# Patient Record
Sex: MALE | Race: WHITE | HISPANIC OR LATINO | URBAN - METROPOLITAN AREA
[De-identification: names, ages, dates, MRNs, and addresses within clinical notes are randomized per-mention and may not be internally consistent; named-entity substitution may affect disease eponyms.]

---

## 2017-03-24 ENCOUNTER — FOLLOW UP ESTABLISHED (OUTPATIENT)
Dept: URBAN - METROPOLITAN AREA CLINIC 51 | Facility: CLINIC | Age: 67
End: 2017-03-24
Payer: COMMERCIAL

## 2017-03-24 DIAGNOSIS — H52.4 PRESBYOPIA: ICD-10-CM

## 2017-03-24 DIAGNOSIS — H25.13 AGE-RELATED NUCLEAR CATARACT, BILATERAL: Primary | ICD-10-CM

## 2017-03-24 PROCEDURE — 92014 COMPRE OPH EXAM EST PT 1/>: CPT | Performed by: OPTOMETRIST

## 2017-03-24 PROCEDURE — 92015 DETERMINE REFRACTIVE STATE: CPT | Performed by: OPTOMETRIST

## 2017-03-24 ASSESSMENT — VISUAL ACUITY
OS: 20/25
OD: 20/20

## 2017-03-24 ASSESSMENT — INTRAOCULAR PRESSURE
OD: 16
OS: 15

## 2018-08-02 ENCOUNTER — FOLLOW UP ESTABLISHED (OUTPATIENT)
Dept: URBAN - METROPOLITAN AREA CLINIC 51 | Facility: CLINIC | Age: 68
End: 2018-08-02
Payer: COMMERCIAL

## 2018-08-02 PROCEDURE — 76514 ECHO EXAM OF EYE THICKNESS: CPT | Performed by: OPTOMETRIST

## 2018-08-02 PROCEDURE — 92014 COMPRE OPH EXAM EST PT 1/>: CPT | Performed by: OPTOMETRIST

## 2018-08-02 PROCEDURE — 92015 DETERMINE REFRACTIVE STATE: CPT | Performed by: OPTOMETRIST

## 2018-08-02 PROCEDURE — 92133 CPTRZD OPH DX IMG PST SGM ON: CPT | Performed by: OPTOMETRIST

## 2018-08-02 ASSESSMENT — INTRAOCULAR PRESSURE
OD: 24
OS: 26
OD: 30
OS: 32

## 2018-08-02 ASSESSMENT — KERATOMETRY
OD: 43.56
OS: 42.62

## 2018-08-02 ASSESSMENT — VISUAL ACUITY
OS: 20/25
OD: 20/20

## 2018-09-13 ENCOUNTER — FOLLOW UP ESTABLISHED (OUTPATIENT)
Dept: URBAN - METROPOLITAN AREA CLINIC 51 | Facility: CLINIC | Age: 68
End: 2018-09-13
Payer: COMMERCIAL

## 2018-09-13 DIAGNOSIS — H40.053 OCULAR HYPERTENSION, BILATERAL: Primary | ICD-10-CM

## 2018-09-13 PROCEDURE — 92083 EXTENDED VISUAL FIELD XM: CPT | Performed by: OPTOMETRIST

## 2018-09-13 PROCEDURE — 92012 INTRM OPH EXAM EST PATIENT: CPT | Performed by: OPTOMETRIST

## 2018-09-13 RX ORDER — LATANOPROST 50 UG/ML
0.005 % SOLUTION OPHTHALMIC
Qty: 3 | Refills: 3 | Status: INACTIVE
Start: 2018-09-13 | End: 2018-11-15

## 2018-09-13 ASSESSMENT — INTRAOCULAR PRESSURE
OS: 33
OD: 25

## 2018-10-18 ENCOUNTER — FOLLOW UP ESTABLISHED (OUTPATIENT)
Dept: URBAN - METROPOLITAN AREA CLINIC 51 | Facility: CLINIC | Age: 68
End: 2018-10-18
Payer: COMMERCIAL

## 2018-10-18 PROCEDURE — 76514 ECHO EXAM OF EYE THICKNESS: CPT | Performed by: OPTOMETRIST

## 2018-10-18 PROCEDURE — 92012 INTRM OPH EXAM EST PATIENT: CPT | Performed by: OPTOMETRIST

## 2018-10-18 RX ORDER — BRIMONIDINE TARTRATE 1 MG/ML
0.1 % SOLUTION/ DROPS OPHTHALMIC
Qty: 3 | Refills: 1 | Status: INACTIVE
Start: 2018-10-18 | End: 2019-01-10

## 2018-10-18 ASSESSMENT — INTRAOCULAR PRESSURE
OS: 24
OD: 20

## 2019-02-12 ENCOUNTER — FOLLOW UP ESTABLISHED (OUTPATIENT)
Dept: URBAN - METROPOLITAN AREA CLINIC 51 | Facility: CLINIC | Age: 69
End: 2019-02-12
Payer: COMMERCIAL

## 2019-02-12 PROCEDURE — 92012 INTRM OPH EXAM EST PATIENT: CPT | Performed by: OPTOMETRIST

## 2019-02-12 ASSESSMENT — INTRAOCULAR PRESSURE
OS: 20
OD: 20

## 2019-09-11 ENCOUNTER — FOLLOW UP ESTABLISHED (OUTPATIENT)
Dept: URBAN - METROPOLITAN AREA CLINIC 51 | Facility: CLINIC | Age: 69
End: 2019-09-11
Payer: COMMERCIAL

## 2019-09-11 PROCEDURE — 92083 EXTENDED VISUAL FIELD XM: CPT | Performed by: OPTOMETRIST

## 2019-09-11 PROCEDURE — 92015 DETERMINE REFRACTIVE STATE: CPT | Performed by: OPTOMETRIST

## 2019-09-11 PROCEDURE — 92133 CPTRZD OPH DX IMG PST SGM ON: CPT | Performed by: OPTOMETRIST

## 2019-09-11 PROCEDURE — 92014 COMPRE OPH EXAM EST PT 1/>: CPT | Performed by: OPTOMETRIST

## 2019-09-11 PROCEDURE — 92134 CPTRZ OPH DX IMG PST SGM RTA: CPT | Performed by: OPTOMETRIST

## 2019-09-11 RX ORDER — BRINZOLAMIDE/BRIMONIDINE TARTRATE 10; 2 MG/ML; MG/ML
SUSPENSION/ DROPS OPHTHALMIC
Qty: 3 | Refills: 3 | Status: INACTIVE
Start: 2019-09-11 | End: 2019-09-19

## 2019-09-11 RX ORDER — LATANOPROST 50 UG/ML
0.005 % SOLUTION OPHTHALMIC
Qty: 3 | Refills: 3 | Status: INACTIVE
Start: 2019-09-11 | End: 2020-05-01

## 2019-09-11 ASSESSMENT — INTRAOCULAR PRESSURE
OD: 25
OS: 25
OS: 28
OD: 26

## 2019-09-11 ASSESSMENT — KERATOMETRY
OS: 43.22
OD: 43.78

## 2019-09-11 ASSESSMENT — VISUAL ACUITY
OD: 20/20
OS: 20/30

## 2020-01-30 ENCOUNTER — FOLLOW UP ESTABLISHED (OUTPATIENT)
Dept: URBAN - METROPOLITAN AREA CLINIC 51 | Facility: CLINIC | Age: 70
End: 2020-01-30
Payer: COMMERCIAL

## 2020-01-30 PROCEDURE — 92012 INTRM OPH EXAM EST PATIENT: CPT | Performed by: OPTOMETRIST

## 2020-01-30 PROCEDURE — 92083 EXTENDED VISUAL FIELD XM: CPT | Performed by: OPTOMETRIST

## 2020-01-30 ASSESSMENT — INTRAOCULAR PRESSURE
OD: 15
OS: 19

## 2020-05-01 ENCOUNTER — FOLLOW UP ESTABLISHED (OUTPATIENT)
Dept: URBAN - METROPOLITAN AREA CLINIC 51 | Facility: CLINIC | Age: 70
End: 2020-05-01
Payer: COMMERCIAL

## 2020-05-01 PROCEDURE — 92012 INTRM OPH EXAM EST PATIENT: CPT | Performed by: OPTOMETRIST

## 2020-05-01 RX ORDER — LATANOPROST 50 UG/ML
0.005 % SOLUTION OPHTHALMIC
Qty: 3 | Refills: 3 | Status: INACTIVE
Start: 2020-05-01 | End: 2020-08-31

## 2020-05-01 RX ORDER — BRINZOLAMIDE/BRIMONIDINE TARTRATE 10; 2 MG/ML; MG/ML
SUSPENSION/ DROPS OPHTHALMIC
Qty: 3 | Refills: 3 | Status: INACTIVE
Start: 2020-05-01 | End: 2020-08-31

## 2020-05-01 ASSESSMENT — INTRAOCULAR PRESSURE
OD: 20
OS: 21

## 2020-09-21 ENCOUNTER — FOLLOW UP ESTABLISHED (OUTPATIENT)
Dept: URBAN - METROPOLITAN AREA CLINIC 51 | Facility: CLINIC | Age: 70
End: 2020-09-21
Payer: COMMERCIAL

## 2020-09-21 PROCEDURE — 92014 COMPRE OPH EXAM EST PT 1/>: CPT | Performed by: OPTOMETRIST

## 2020-09-21 PROCEDURE — 92133 CPTRZD OPH DX IMG PST SGM ON: CPT | Performed by: OPTOMETRIST

## 2020-09-21 PROCEDURE — 92083 EXTENDED VISUAL FIELD XM: CPT | Performed by: OPTOMETRIST

## 2020-09-21 PROCEDURE — 92015 DETERMINE REFRACTIVE STATE: CPT | Performed by: OPTOMETRIST

## 2020-09-21 ASSESSMENT — INTRAOCULAR PRESSURE
OD: 16
OS: 16
OD: 14
OS: 15

## 2020-09-21 ASSESSMENT — VISUAL ACUITY
OS: 20/25
OD: 20/20

## 2020-09-21 ASSESSMENT — KERATOMETRY: OD: 43.73

## 2021-03-16 ENCOUNTER — FOLLOW UP ESTABLISHED (OUTPATIENT)
Dept: URBAN - METROPOLITAN AREA CLINIC 51 | Facility: CLINIC | Age: 71
End: 2021-03-16
Payer: COMMERCIAL

## 2021-03-16 DIAGNOSIS — H47.212 PRIMARY OPTIC ATROPHY, LEFT EYE: ICD-10-CM

## 2021-03-16 DIAGNOSIS — H04.123 TEAR FILM INSUFFICIENCY OF BILATERAL LACRIMAL GLANDS: ICD-10-CM

## 2021-03-16 PROCEDURE — 99213 OFFICE O/P EST LOW 20 MIN: CPT | Performed by: OPTOMETRIST

## 2021-03-16 PROCEDURE — 92083 EXTENDED VISUAL FIELD XM: CPT | Performed by: OPTOMETRIST

## 2021-03-16 RX ORDER — LATANOPROST 50 UG/ML
0.005 % SOLUTION OPHTHALMIC
Qty: 7.5 | Refills: 3 | Status: INACTIVE
Start: 2021-03-16 | End: 2021-09-30

## 2021-03-16 RX ORDER — BRINZOLAMIDE/BRIMONIDINE TARTRATE 10; 2 MG/ML; MG/ML
SUSPENSION/ DROPS OPHTHALMIC
Qty: 10 | Refills: 3 | Status: INACTIVE
Start: 2021-03-16 | End: 2021-09-30

## 2021-03-16 ASSESSMENT — INTRAOCULAR PRESSURE
OD: 24
OS: 24

## 2021-06-09 ENCOUNTER — OFFICE VISIT (OUTPATIENT)
Dept: URBAN - METROPOLITAN AREA CLINIC 51 | Facility: CLINIC | Age: 71
End: 2021-06-09
Payer: COMMERCIAL

## 2021-06-09 PROCEDURE — 99214 OFFICE O/P EST MOD 30 MIN: CPT | Performed by: OPTOMETRIST

## 2021-06-09 RX ORDER — TIMOLOL 5.12 MG/ML
0.5 % SOLUTION/ DROPS OPHTHALMIC
Qty: 7.5 | Refills: 1 | Status: INACTIVE
Start: 2021-06-09 | End: 2021-09-01

## 2021-06-09 ASSESSMENT — INTRAOCULAR PRESSURE
OD: 18
OS: 21

## 2021-06-09 NOTE — IMPRESSION/PLAN
Impression: Ocular hypertension, bilateral
continue latanoprost QPM OU simbrinza bid OU Pach (10/18/18) 590/590 Plan: IOP 17/21 RNFL wnl OD abnormal OS 
VF general reduction > OD ring defect 
(9/11/19)- In alt OS - likely ischemic optic neuropathy OS - OLD Start Timolol BID OU 
RTC 3mos CE/RNFL

## 2021-09-30 ENCOUNTER — OFFICE VISIT (OUTPATIENT)
Dept: URBAN - METROPOLITAN AREA CLINIC 51 | Facility: CLINIC | Age: 71
End: 2021-09-30
Payer: COMMERCIAL

## 2021-09-30 PROCEDURE — 92133 CPTRZD OPH DX IMG PST SGM ON: CPT | Performed by: OPTOMETRIST

## 2021-09-30 PROCEDURE — 92014 COMPRE OPH EXAM EST PT 1/>: CPT | Performed by: OPTOMETRIST

## 2021-09-30 RX ORDER — BRINZOLAMIDE/BRIMONIDINE TARTRATE 10; 2 MG/ML; MG/ML
SUSPENSION/ DROPS OPHTHALMIC
Qty: 10 | Refills: 3 | Status: ACTIVE
Start: 2021-09-30

## 2021-09-30 RX ORDER — LATANOPROST 50 UG/ML
0.005 % SOLUTION OPHTHALMIC
Qty: 7.5 | Refills: 3 | Status: ACTIVE
Start: 2021-09-30

## 2021-09-30 RX ORDER — TIMOLOL 5.12 MG/ML
0.5 % SOLUTION/ DROPS OPHTHALMIC
Qty: 7.5 | Refills: 1 | Status: INACTIVE
Start: 2021-09-30 | End: 2022-03-30

## 2021-09-30 ASSESSMENT — INTRAOCULAR PRESSURE
OS: 14
OD: 15
OS: 17

## 2021-09-30 ASSESSMENT — VISUAL ACUITY
OS: 20/25
OD: 20/20

## 2021-09-30 ASSESSMENT — KERATOMETRY
OS: 42.42
OD: 43.47

## 2021-09-30 NOTE — IMPRESSION/PLAN
Impression: Ocular hypertension, bilateral
Pach (10/18/18) 590/590 Plan: IOP 15/14 RNFL wnl OD abnormal OS 
VF general reduction > OD ring defect, arcuate defect OS 
(9/11/19)- In alt OS - likely ischemic optic neuropathy OS - OLD
continue Timolol BID OU, continue Latanoprost QPM OU simbrinza bid OU 
RTC 6mos IOP/HVF

## 2022-03-30 ENCOUNTER — OFFICE VISIT (OUTPATIENT)
Dept: URBAN - METROPOLITAN AREA CLINIC 51 | Facility: CLINIC | Age: 72
End: 2022-03-30
Payer: COMMERCIAL

## 2022-03-30 PROCEDURE — 99213 OFFICE O/P EST LOW 20 MIN: CPT | Performed by: OPTOMETRIST

## 2022-03-30 PROCEDURE — 92083 EXTENDED VISUAL FIELD XM: CPT | Performed by: OPTOMETRIST

## 2022-03-30 RX ORDER — TIMOLOL 5.12 MG/ML
0.5 % SOLUTION/ DROPS OPHTHALMIC
Qty: 7.5 | Refills: 1 | Status: ACTIVE
Start: 2022-03-30

## 2022-03-30 ASSESSMENT — INTRAOCULAR PRESSURE
OD: 17
OS: 18

## 2022-03-30 NOTE — IMPRESSION/PLAN
Impression: Ocular hypertension, bilateral
Pach (10/18/18) 590/590 Plan: IOP 17/18 slight increase RNFL wnl OD abnormal OS 
VF general reduction > In alt OS - likely ischemic optic neuropathy OS - OLD
continue Timolol BID OU, continue Latanoprost QPM OU simbrinza bid OU 
(pt reports missing a couple gtts here and there) 3mos IOP may consider change in drops or referral to glc if iop > than goal

## 2022-07-20 ENCOUNTER — OFFICE VISIT (OUTPATIENT)
Dept: URBAN - METROPOLITAN AREA CLINIC 51 | Facility: CLINIC | Age: 72
End: 2022-07-20
Payer: COMMERCIAL

## 2022-07-20 DIAGNOSIS — H52.4 PRESBYOPIA: ICD-10-CM

## 2022-07-20 DIAGNOSIS — H40.053 OCULAR HYPERTENSION, BILATERAL: Primary | ICD-10-CM

## 2022-07-20 PROCEDURE — 99214 OFFICE O/P EST MOD 30 MIN: CPT | Performed by: OPTOMETRIST

## 2022-07-20 RX ORDER — TIMOLOL 5.12 MG/ML
0.5 % SOLUTION/ DROPS OPHTHALMIC
Qty: 7.5 | Refills: 2 | Status: ACTIVE
Start: 2022-07-20

## 2022-07-20 RX ORDER — LATANOPROST 50 UG/ML
0.005 % SOLUTION OPHTHALMIC
Qty: 7.5 | Refills: 3 | Status: ACTIVE
Start: 2022-07-20

## 2022-07-20 RX ORDER — BRINZOLAMIDE/BRIMONIDINE TARTRATE 10; 2 MG/ML; MG/ML
SUSPENSION/ DROPS OPHTHALMIC
Qty: 10 | Refills: 3 | Status: ACTIVE
Start: 2022-07-20

## 2022-07-20 ASSESSMENT — INTRAOCULAR PRESSURE
OD: 18
OS: 21

## 2022-07-20 NOTE — IMPRESSION/PLAN
Impression: Ocular hypertension, bilateral
Pach (10/18/18) 590/590 Plan: IOP 18/21 increasing RNFL wnl OD abnormal OS 
VF general reduction > In alt OS - likely ischemic optic neuropathy OS - OLD
continue Timolol BID OU, continue Latanoprost QPM OU simbrinza bid OU 
(pt reports missing a couple gtts here and there) 
referral to glc

## 2022-10-24 ENCOUNTER — OFFICE VISIT (OUTPATIENT)
Dept: URBAN - METROPOLITAN AREA CLINIC 44 | Facility: CLINIC | Age: 72
End: 2022-10-24
Payer: COMMERCIAL

## 2022-10-24 DIAGNOSIS — H47.212 PRIMARY OPTIC ATROPHY, LEFT EYE: ICD-10-CM

## 2022-10-24 DIAGNOSIS — H04.123 TEAR FILM INSUFFICIENCY OF BILATERAL LACRIMAL GLANDS: ICD-10-CM

## 2022-10-24 DIAGNOSIS — H25.13 AGE-RELATED NUCLEAR CATARACT, BILATERAL: ICD-10-CM

## 2022-10-24 DIAGNOSIS — H40.053 OCULAR HYPERTENSION, BILATERAL: ICD-10-CM

## 2022-10-24 DIAGNOSIS — H40.1134 PRIMARY OPEN-ANGLE GLAUCOMA, BILATERAL, INDETERMINATE STAGE: Primary | ICD-10-CM

## 2022-10-24 PROCEDURE — 99204 OFFICE O/P NEW MOD 45 MIN: CPT | Performed by: OPHTHALMOLOGY

## 2022-10-24 PROCEDURE — 92020 GONIOSCOPY: CPT | Performed by: OPHTHALMOLOGY

## 2022-10-24 PROCEDURE — 92083 EXTENDED VISUAL FIELD XM: CPT | Performed by: OPHTHALMOLOGY

## 2022-10-24 RX ORDER — TIMOLOL MALEATE 5 MG/ML
0.5 % SOLUTION/ DROPS OPHTHALMIC
Qty: 7.5 | Refills: 1 | Status: ACTIVE
Start: 2022-10-24

## 2022-10-24 RX ORDER — LATANOPROST 50 UG/ML
0.005 % SOLUTION OPHTHALMIC
Qty: 7.5 | Refills: 1 | Status: ACTIVE
Start: 2022-10-24

## 2022-10-24 RX ORDER — BRINZOLAMIDE/BRIMONIDINE TARTRATE 10; 2 MG/ML; MG/ML
SUSPENSION/ DROPS OPHTHALMIC
Qty: 15 | Refills: 1 | Status: ACTIVE
Start: 2022-10-24

## 2022-10-24 ASSESSMENT — INTRAOCULAR PRESSURE
OS: 16
OD: 14
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
## 2022-10-24 NOTE — IMPRESSION/PLAN
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
See other plan R35.2316

## 2023-02-20 ENCOUNTER — OFFICE VISIT (OUTPATIENT)
Dept: URBAN - METROPOLITAN AREA CLINIC 44 | Facility: CLINIC | Age: 73
End: 2023-02-20
Payer: COMMERCIAL

## 2023-02-20 DIAGNOSIS — H47.212 PRIMARY OPTIC ATROPHY, LEFT EYE: ICD-10-CM

## 2023-02-20 DIAGNOSIS — H40.1134 PRIMARY OPEN-ANGLE GLAUCOMA, BILATERAL, INDETERMINATE STAGE: Primary | ICD-10-CM

## 2023-02-20 DIAGNOSIS — H04.123 TEAR FILM INSUFFICIENCY OF BILATERAL LACRIMAL GLANDS: ICD-10-CM

## 2023-02-20 DIAGNOSIS — H25.13 AGE-RELATED NUCLEAR CATARACT, BILATERAL: ICD-10-CM

## 2023-02-20 PROCEDURE — 92133 CPTRZD OPH DX IMG PST SGM ON: CPT | Performed by: OPHTHALMOLOGY

## 2023-02-20 PROCEDURE — 99213 OFFICE O/P EST LOW 20 MIN: CPT | Performed by: OPHTHALMOLOGY

## 2023-02-20 RX ORDER — BRINZOLAMIDE/BRIMONIDINE TARTRATE 10; 2 MG/ML; MG/ML
SUSPENSION/ DROPS OPHTHALMIC
Qty: 15 | Refills: 1 | Status: ACTIVE
Start: 2023-02-20

## 2023-02-20 RX ORDER — LATANOPROST 50 UG/ML
0.005 % SOLUTION OPHTHALMIC
Qty: 7.5 | Refills: 1 | Status: ACTIVE
Start: 2023-02-20

## 2023-02-20 RX ORDER — TIMOLOL MALEATE 5 MG/ML
0.5 % SOLUTION/ DROPS OPHTHALMIC
Qty: 7.5 | Refills: 1 | Status: ACTIVE
Start: 2023-02-20

## 2023-02-20 ASSESSMENT — INTRAOCULAR PRESSURE
OD: 15
OS: 15
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
## 2023-06-26 ENCOUNTER — OFFICE VISIT (OUTPATIENT)
Dept: URBAN - METROPOLITAN AREA CLINIC 44 | Facility: CLINIC | Age: 73
End: 2023-06-26
Payer: COMMERCIAL

## 2023-06-26 DIAGNOSIS — H02.834 DERMATOCHALASIS OF LEFT UPPER EYELID: ICD-10-CM

## 2023-06-26 DIAGNOSIS — H47.212 PRIMARY OPTIC ATROPHY, LEFT EYE: ICD-10-CM

## 2023-06-26 DIAGNOSIS — H40.1134 PRIMARY OPEN-ANGLE GLAUCOMA, BILATERAL, INDETERMINATE STAGE: Primary | ICD-10-CM

## 2023-06-26 DIAGNOSIS — H02.831 DERMATOCHALASIS OF RIGHT UPPER EYELID: ICD-10-CM

## 2023-06-26 DIAGNOSIS — H25.13 AGE-RELATED NUCLEAR CATARACT, BILATERAL: ICD-10-CM

## 2023-06-26 DIAGNOSIS — H04.123 TEAR FILM INSUFFICIENCY OF BILATERAL LACRIMAL GLANDS: ICD-10-CM

## 2023-06-26 PROCEDURE — 92083 EXTENDED VISUAL FIELD XM: CPT | Performed by: OPHTHALMOLOGY

## 2023-06-26 PROCEDURE — 99213 OFFICE O/P EST LOW 20 MIN: CPT | Performed by: OPHTHALMOLOGY

## 2023-06-26 RX ORDER — LATANOPROST 50 UG/ML
0.005 % SOLUTION OPHTHALMIC
Qty: 7.5 | Refills: 1 | Status: ACTIVE
Start: 2023-06-26

## 2023-06-26 RX ORDER — TIMOLOL MALEATE 5 MG/ML
0.5 % SOLUTION/ DROPS OPHTHALMIC
Qty: 10 | Refills: 1 | Status: ACTIVE
Start: 2023-06-26

## 2023-06-26 RX ORDER — BRINZOLAMIDE/BRIMONIDINE TARTRATE 10; 2 MG/ML; MG/ML
SUSPENSION/ DROPS OPHTHALMIC
Qty: 15 | Refills: 1 | Status: ACTIVE
Start: 2023-06-26

## 2023-06-26 RX ORDER — TIMOLOL MALEATE 5 MG/ML
0.5 % SOLUTION/ DROPS OPHTHALMIC
Qty: 7.5 | Refills: 1 | Status: INACTIVE
Start: 2023-06-26 | End: 2023-06-26

## 2023-06-26 ASSESSMENT — INTRAOCULAR PRESSURE
OD: 16
OS: 16
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
## 2023-06-26 NOTE — IMPRESSION/PLAN
Impression: Age-related nuclear cataract, bilateral Plan: monitor, will obtain cat/complete eval when patient wishes

## 2023-06-26 NOTE — IMPRESSION/PLAN
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
See other plan A50.7122

## 2023-10-02 ENCOUNTER — OFFICE VISIT (OUTPATIENT)
Dept: URBAN - METROPOLITAN AREA CLINIC 44 | Facility: CLINIC | Age: 73
End: 2023-10-02
Payer: COMMERCIAL

## 2023-10-02 DIAGNOSIS — H04.123 TEAR FILM INSUFFICIENCY OF BILATERAL LACRIMAL GLANDS: ICD-10-CM

## 2023-10-02 DIAGNOSIS — H02.831 DERMATOCHALASIS OF RIGHT UPPER EYELID: ICD-10-CM

## 2023-10-02 DIAGNOSIS — H47.212 PRIMARY OPTIC ATROPHY, LEFT EYE: ICD-10-CM

## 2023-10-02 DIAGNOSIS — H25.13 AGE-RELATED NUCLEAR CATARACT, BILATERAL: ICD-10-CM

## 2023-10-02 DIAGNOSIS — H02.834 DERMATOCHALASIS OF LEFT UPPER EYELID: ICD-10-CM

## 2023-10-02 DIAGNOSIS — H40.1134 PRIMARY OPEN-ANGLE GLAUCOMA, BILATERAL, INDETERMINATE STAGE: Primary | ICD-10-CM

## 2023-10-02 PROCEDURE — 99214 OFFICE O/P EST MOD 30 MIN: CPT | Performed by: OPHTHALMOLOGY

## 2023-10-02 RX ORDER — BRINZOLAMIDE/BRIMONIDINE TARTRATE 10; 2 MG/ML; MG/ML
SUSPENSION/ DROPS OPHTHALMIC
Qty: 15 | Refills: 2 | Status: ACTIVE
Start: 2023-10-02

## 2023-10-02 RX ORDER — TIMOLOL MALEATE 5 MG/ML
0.5 % SOLUTION/ DROPS OPHTHALMIC
Qty: 10 | Refills: 1 | Status: ACTIVE
Start: 2023-10-02

## 2023-10-02 RX ORDER — LATANOPROST 50 UG/ML
0.005 % SOLUTION OPHTHALMIC
Qty: 7.5 | Refills: 1 | Status: ACTIVE
Start: 2023-10-02

## 2023-10-02 ASSESSMENT — INTRAOCULAR PRESSURE
OS: 24
OD: 23

## 2023-11-10 ENCOUNTER — OFFICE VISIT (OUTPATIENT)
Dept: URBAN - METROPOLITAN AREA CLINIC 51 | Facility: CLINIC | Age: 73
End: 2023-11-10
Payer: COMMERCIAL

## 2023-11-10 DIAGNOSIS — H40.053 OCULAR HYPERTENSION, BILATERAL: ICD-10-CM

## 2023-11-10 DIAGNOSIS — H02.834 DERMATOCHALASIS OF LEFT UPPER EYELID: ICD-10-CM

## 2023-11-10 DIAGNOSIS — H40.1134 PRIMARY OPEN-ANGLE GLAUCOMA, BILATERAL, INDETERMINATE STAGE: Primary | ICD-10-CM

## 2023-11-10 DIAGNOSIS — H02.831 DERMATOCHALASIS OF RIGHT UPPER EYELID: ICD-10-CM

## 2023-11-10 DIAGNOSIS — H25.13 AGE-RELATED NUCLEAR CATARACT, BILATERAL: ICD-10-CM

## 2023-11-10 DIAGNOSIS — H47.212 PRIMARY OPTIC ATROPHY, LEFT EYE: ICD-10-CM

## 2023-11-10 DIAGNOSIS — H04.123 TEAR FILM INSUFFICIENCY OF BILATERAL LACRIMAL GLANDS: ICD-10-CM

## 2023-11-10 PROCEDURE — 92020 GONIOSCOPY: CPT | Performed by: OPHTHALMOLOGY

## 2023-11-10 PROCEDURE — 92133 CPTRZD OPH DX IMG PST SGM ON: CPT | Performed by: OPHTHALMOLOGY

## 2023-11-10 PROCEDURE — 99214 OFFICE O/P EST MOD 30 MIN: CPT | Performed by: OPHTHALMOLOGY

## 2023-11-10 RX ORDER — BRINZOLAMIDE/BRIMONIDINE TARTRATE 10; 2 MG/ML; MG/ML
SUSPENSION/ DROPS OPHTHALMIC
Qty: 15 | Refills: 2 | Status: ACTIVE
Start: 2023-11-10

## 2023-11-10 RX ORDER — TIMOLOL MALEATE 5 MG/ML
0.5 % SOLUTION/ DROPS OPHTHALMIC
Qty: 10 | Refills: 1 | Status: ACTIVE
Start: 2023-11-10

## 2023-11-10 RX ORDER — LATANOPROST 50 UG/ML
0.005 % SOLUTION OPHTHALMIC
Qty: 7.5 | Refills: 1 | Status: ACTIVE
Start: 2023-11-10

## 2023-11-10 ASSESSMENT — INTRAOCULAR PRESSURE
OS: 17
OD: 17

## 2023-12-14 ENCOUNTER — OFFICE VISIT (OUTPATIENT)
Dept: URBAN - METROPOLITAN AREA CLINIC 51 | Facility: CLINIC | Age: 73
End: 2023-12-14
Payer: COMMERCIAL

## 2023-12-14 DIAGNOSIS — H40.1134 PRIMARY OPEN-ANGLE GLAUCOMA, BILATERAL, INDETERMINATE STAGE: ICD-10-CM

## 2023-12-14 DIAGNOSIS — H43.313 VITREOUS MEMBRANES AND STRANDS, BILATERAL: ICD-10-CM

## 2023-12-14 DIAGNOSIS — H25.13 AGE-RELATED NUCLEAR CATARACT, BILATERAL: Primary | ICD-10-CM

## 2023-12-14 DIAGNOSIS — H52.4 PRESBYOPIA: ICD-10-CM

## 2023-12-14 PROCEDURE — 92133 CPTRZD OPH DX IMG PST SGM ON: CPT | Performed by: OPTOMETRIST

## 2023-12-14 PROCEDURE — 99214 OFFICE O/P EST MOD 30 MIN: CPT | Performed by: OPTOMETRIST

## 2023-12-14 PROCEDURE — 92134 CPTRZ OPH DX IMG PST SGM RTA: CPT | Performed by: OPTOMETRIST

## 2023-12-14 RX ORDER — BRINZOLAMIDE/BRIMONIDINE TARTRATE 10; 2 MG/ML; MG/ML
SUSPENSION/ DROPS OPHTHALMIC
Qty: 15 | Refills: 2 | Status: ACTIVE
Start: 2023-12-14

## 2023-12-14 ASSESSMENT — INTRAOCULAR PRESSURE
OS: 10
OD: 10

## 2023-12-14 ASSESSMENT — VISUAL ACUITY
OD: 20/25
OS: 20/25

## 2023-12-14 ASSESSMENT — KERATOMETRY
OD: 43.88
OS: 42.50

## 2024-02-26 ENCOUNTER — OFFICE VISIT (OUTPATIENT)
Dept: URBAN - METROPOLITAN AREA CLINIC 51 | Facility: CLINIC | Age: 74
End: 2024-02-26
Payer: COMMERCIAL

## 2024-02-26 DIAGNOSIS — H47.212 PRIMARY OPTIC ATROPHY, LEFT EYE: ICD-10-CM

## 2024-02-26 DIAGNOSIS — H02.834 DERMATOCHALASIS OF LEFT UPPER EYELID: ICD-10-CM

## 2024-02-26 DIAGNOSIS — H25.13 AGE-RELATED NUCLEAR CATARACT, BILATERAL: ICD-10-CM

## 2024-02-26 DIAGNOSIS — H40.1134 PRIMARY OPEN-ANGLE GLAUCOMA, BILATERAL, INDETERMINATE STAGE: Primary | ICD-10-CM

## 2024-02-26 DIAGNOSIS — H02.831 DERMATOCHALASIS OF RIGHT UPPER EYELID: ICD-10-CM

## 2024-02-26 DIAGNOSIS — H04.123 TEAR FILM INSUFFICIENCY OF BILATERAL LACRIMAL GLANDS: ICD-10-CM

## 2024-02-26 PROCEDURE — 99213 OFFICE O/P EST LOW 20 MIN: CPT | Performed by: OPHTHALMOLOGY

## 2024-02-26 PROCEDURE — 92083 EXTENDED VISUAL FIELD XM: CPT | Performed by: OPHTHALMOLOGY

## 2024-02-26 RX ORDER — BRINZOLAMIDE/BRIMONIDINE TARTRATE 10; 2 MG/ML; MG/ML
SUSPENSION/ DROPS OPHTHALMIC
Qty: 20 | Refills: 1 | Status: ACTIVE
Start: 2024-02-26

## 2024-02-26 ASSESSMENT — INTRAOCULAR PRESSURE
OD: 15
OS: 16

## 2024-06-03 ENCOUNTER — OFFICE VISIT (OUTPATIENT)
Dept: URBAN - METROPOLITAN AREA CLINIC 51 | Facility: CLINIC | Age: 74
End: 2024-06-03
Payer: COMMERCIAL

## 2024-06-03 DIAGNOSIS — H40.1134 PRIMARY OPEN-ANGLE GLAUCOMA, BILATERAL, INDETERMINATE STAGE: Primary | ICD-10-CM

## 2024-06-03 DIAGNOSIS — H04.123 TEAR FILM INSUFFICIENCY OF BILATERAL LACRIMAL GLANDS: ICD-10-CM

## 2024-06-03 DIAGNOSIS — H25.13 AGE-RELATED NUCLEAR CATARACT, BILATERAL: ICD-10-CM

## 2024-06-03 DIAGNOSIS — H02.831 DERMATOCHALASIS OF RIGHT UPPER EYELID: ICD-10-CM

## 2024-06-03 DIAGNOSIS — H02.834 DERMATOCHALASIS OF LEFT UPPER EYELID: ICD-10-CM

## 2024-06-03 DIAGNOSIS — H47.212 PRIMARY OPTIC ATROPHY, LEFT EYE: ICD-10-CM

## 2024-06-03 PROCEDURE — 99213 OFFICE O/P EST LOW 20 MIN: CPT | Performed by: OPHTHALMOLOGY

## 2024-06-03 RX ORDER — TIMOLOL MALEATE 5 MG/ML
0.5 % SOLUTION/ DROPS OPHTHALMIC
Qty: 10 | Refills: 1 | Status: ACTIVE
Start: 2024-06-03

## 2024-06-03 RX ORDER — BRINZOLAMIDE/BRIMONIDINE TARTRATE 10; 2 MG/ML; MG/ML
SUSPENSION/ DROPS OPHTHALMIC
Qty: 20 | Refills: 1 | Status: ACTIVE
Start: 2024-06-03

## 2024-06-03 RX ORDER — LATANOPROST 50 UG/ML
0.005 % SOLUTION OPHTHALMIC
Qty: 7.5 | Refills: 1 | Status: ACTIVE
Start: 2024-06-03

## 2024-06-03 ASSESSMENT — INTRAOCULAR PRESSURE
OS: 15
OD: 14

## 2024-09-09 ENCOUNTER — OFFICE VISIT (OUTPATIENT)
Dept: URBAN - METROPOLITAN AREA CLINIC 51 | Facility: CLINIC | Age: 74
End: 2024-09-09
Payer: COMMERCIAL

## 2024-09-09 DIAGNOSIS — H02.831 DERMATOCHALASIS OF RIGHT UPPER EYELID: ICD-10-CM

## 2024-09-09 DIAGNOSIS — H02.834 DERMATOCHALASIS OF LEFT UPPER EYELID: ICD-10-CM

## 2024-09-09 DIAGNOSIS — H04.123 TEAR FILM INSUFFICIENCY OF BILATERAL LACRIMAL GLANDS: ICD-10-CM

## 2024-09-09 DIAGNOSIS — H25.13 AGE-RELATED NUCLEAR CATARACT, BILATERAL: ICD-10-CM

## 2024-09-09 DIAGNOSIS — H47.212 PRIMARY OPTIC ATROPHY, LEFT EYE: ICD-10-CM

## 2024-09-09 DIAGNOSIS — H40.1134 PRIMARY OPEN-ANGLE GLAUCOMA, BILATERAL, INDETERMINATE STAGE: Primary | ICD-10-CM

## 2024-09-09 PROCEDURE — 92133 CPTRZD OPH DX IMG PST SGM ON: CPT | Performed by: OPHTHALMOLOGY

## 2024-09-09 PROCEDURE — 99214 OFFICE O/P EST MOD 30 MIN: CPT | Performed by: OPHTHALMOLOGY

## 2024-09-09 RX ORDER — BRINZOLAMIDE/BRIMONIDINE TARTRATE 10; 2 MG/ML; MG/ML
SUSPENSION/ DROPS OPHTHALMIC
Qty: 20 | Refills: 1 | Status: ACTIVE
Start: 2024-09-09

## 2024-09-09 ASSESSMENT — INTRAOCULAR PRESSURE
OD: 16
OS: 16

## 2024-10-10 ENCOUNTER — OFFICE VISIT (OUTPATIENT)
Dept: URBAN - METROPOLITAN AREA CLINIC 51 | Facility: CLINIC | Age: 74
End: 2024-10-10
Payer: COMMERCIAL

## 2024-10-10 DIAGNOSIS — H40.1134 PRIMARY OPEN-ANGLE GLAUCOMA, BILATERAL, INDETERMINATE STAGE: ICD-10-CM

## 2024-10-10 DIAGNOSIS — H47.212 PRIMARY OPTIC ATROPHY, LEFT EYE: ICD-10-CM

## 2024-10-10 DIAGNOSIS — H25.813 COMBINED FORMS OF AGE-RELATED CATARACT, BILATERAL: Primary | ICD-10-CM

## 2024-10-10 DIAGNOSIS — H52.4 PRESBYOPIA: ICD-10-CM

## 2024-10-10 DIAGNOSIS — H43.313 VITREOUS MEMBRANES AND STRANDS, BILATERAL: ICD-10-CM

## 2024-10-10 DIAGNOSIS — H04.123 TEAR FILM INSUFFICIENCY OF BILATERAL LACRIMAL GLANDS: ICD-10-CM

## 2024-10-10 PROCEDURE — 92134 CPTRZ OPH DX IMG PST SGM RTA: CPT | Performed by: OPTOMETRIST

## 2024-10-10 PROCEDURE — 92083 EXTENDED VISUAL FIELD XM: CPT | Performed by: OPTOMETRIST

## 2024-10-10 PROCEDURE — 99214 OFFICE O/P EST MOD 30 MIN: CPT | Performed by: OPTOMETRIST

## 2024-10-10 ASSESSMENT — VISUAL ACUITY
OS: 20/20
OD: 20/100

## 2024-10-10 ASSESSMENT — INTRAOCULAR PRESSURE
OD: 15
OS: 16

## 2024-10-10 ASSESSMENT — KERATOMETRY
OS: 42.75
OD: 43.88

## 2024-10-23 ENCOUNTER — ADULT PHYSICAL (OUTPATIENT)
Dept: URBAN - METROPOLITAN AREA CLINIC 51 | Facility: CLINIC | Age: 74
End: 2024-10-23
Payer: COMMERCIAL

## 2024-10-23 DIAGNOSIS — Z01.818 ENCOUNTER FOR OTHER PREPROCEDURAL EXAMINATION: Primary | ICD-10-CM

## 2024-10-23 DIAGNOSIS — H25.813 COMBINED FORMS OF AGE-RELATED CATARACT, BILATERAL: ICD-10-CM

## 2024-10-23 PROCEDURE — 99203 OFFICE O/P NEW LOW 30 MIN: CPT | Performed by: PHYSICIAN ASSISTANT

## 2024-11-21 ENCOUNTER — ADULT PHYSICAL (OUTPATIENT)
Dept: URBAN - METROPOLITAN AREA CLINIC 51 | Facility: CLINIC | Age: 74
End: 2024-11-21
Payer: COMMERCIAL

## 2024-11-21 DIAGNOSIS — H25.813 COMBINED FORMS OF AGE-RELATED CATARACT, BILATERAL: ICD-10-CM

## 2024-11-21 DIAGNOSIS — Z01.818 ENCOUNTER FOR OTHER PREPROCEDURAL EXAMINATION: Primary | ICD-10-CM

## 2024-11-21 PROCEDURE — 99213 OFFICE O/P EST LOW 20 MIN: CPT | Performed by: PHYSICIAN ASSISTANT

## 2024-12-02 ENCOUNTER — OFFICE VISIT (OUTPATIENT)
Dept: URBAN - METROPOLITAN AREA CLINIC 51 | Facility: CLINIC | Age: 74
End: 2024-12-02
Payer: COMMERCIAL

## 2024-12-02 DIAGNOSIS — H25.812 COMBINED FORMS OF AGE-RELATED CATARACT, LEFT EYE: ICD-10-CM

## 2024-12-02 DIAGNOSIS — H43.393 OTHER VITREOUS OPACITIES, BILATERAL: ICD-10-CM

## 2024-12-02 DIAGNOSIS — H52.203 BILATERAL ASTIGMATISM: ICD-10-CM

## 2024-12-02 DIAGNOSIS — H25.813 COMBINED FORMS OF AGE-RELATED CATARACT, BILATERAL: Primary | ICD-10-CM

## 2024-12-02 DIAGNOSIS — H02.831 DERMATOCHALASIS OF RIGHT UPPER EYELID: ICD-10-CM

## 2024-12-02 DIAGNOSIS — H02.834 DERMATOCHALASIS OF LEFT UPPER EYELID: ICD-10-CM

## 2024-12-02 DIAGNOSIS — H34.8322 BRANCH RETINAL VEIN OCCLUSION, STABLE, LEFT EYE: ICD-10-CM

## 2024-12-02 DIAGNOSIS — H40.1122 PRIMARY OPEN-ANGLE GLAUCOMA, MODERATE STAGE, LEFT EYE: ICD-10-CM

## 2024-12-02 DIAGNOSIS — H40.1111 PRIMARY OPEN-ANGLE GLAUCOMA, MILD STAGE, RIGHT EYE: ICD-10-CM

## 2024-12-02 DIAGNOSIS — H47.212 PRIMARY OPTIC ATROPHY, LEFT EYE: ICD-10-CM

## 2024-12-02 DIAGNOSIS — H04.123 TEAR FILM INSUFFICIENCY OF BILATERAL LACRIMAL GLANDS: ICD-10-CM

## 2024-12-02 PROCEDURE — 92020 GONIOSCOPY: CPT | Performed by: OPHTHALMOLOGY

## 2024-12-02 PROCEDURE — 99214 OFFICE O/P EST MOD 30 MIN: CPT | Performed by: OPHTHALMOLOGY

## 2024-12-02 RX ORDER — PREDNISOLONE ACETATE 10 MG/ML
1 % SUSPENSION/ DROPS OPHTHALMIC
Qty: 5 | Refills: 2 | Status: ACTIVE
Start: 2024-12-02

## 2024-12-02 RX ORDER — LATANOPROST 50 UG/ML
0.005 % SOLUTION OPHTHALMIC
Qty: 7.5 | Refills: 1 | Status: ACTIVE
Start: 2024-12-02

## 2024-12-02 RX ORDER — DICLOFENAC SODIUM 1 MG/ML
0.1 % SOLUTION/ DROPS OPHTHALMIC
Qty: 5 | Refills: 2 | Status: ACTIVE
Start: 2024-12-02

## 2024-12-02 ASSESSMENT — INTRAOCULAR PRESSURE
OS: 18
OD: 17

## 2024-12-10 ENCOUNTER — OFFICE VISIT (OUTPATIENT)
Dept: URBAN - METROPOLITAN AREA CLINIC 51 | Facility: CLINIC | Age: 74
End: 2024-12-10
Payer: COMMERCIAL

## 2024-12-10 DIAGNOSIS — H34.8320 BRANCH RETINAL VEIN OCCLUSION W/ MACULAR EDEMA, LEFT EYE: Primary | ICD-10-CM

## 2024-12-10 DIAGNOSIS — H25.813 COMBINED FORMS OF AGE-RELATED CATARACT, BILATERAL: ICD-10-CM

## 2024-12-10 PROCEDURE — 67210 TREATMENT OF RETINAL LESION: CPT | Performed by: OPHTHALMOLOGY

## 2024-12-10 PROCEDURE — 92235 FLUORESCEIN ANGRPH MLTIFRAME: CPT | Performed by: OPHTHALMOLOGY

## 2024-12-10 PROCEDURE — 99214 OFFICE O/P EST MOD 30 MIN: CPT | Performed by: OPHTHALMOLOGY

## 2024-12-10 PROCEDURE — 92134 CPTRZ OPH DX IMG PST SGM RTA: CPT | Performed by: OPHTHALMOLOGY

## 2024-12-10 ASSESSMENT — INTRAOCULAR PRESSURE
OS: 12
OD: 12

## 2024-12-24 ENCOUNTER — OFFICE VISIT (OUTPATIENT)
Dept: URBAN - METROPOLITAN AREA CLINIC 51 | Facility: CLINIC | Age: 74
End: 2024-12-24
Payer: COMMERCIAL

## 2024-12-24 DIAGNOSIS — H25.813 COMBINED FORMS OF AGE-RELATED CATARACT, BILATERAL: ICD-10-CM

## 2024-12-24 DIAGNOSIS — H34.8320 TRIBUTARY (BRANCH) RETINAL VEIN OCCLUSION, LEFT EYE, WITH MACULAR EDEMA: Primary | ICD-10-CM

## 2024-12-24 PROCEDURE — 67028 INJECTION EYE DRUG: CPT | Performed by: OPHTHALMOLOGY

## 2024-12-24 PROCEDURE — 92134 CPTRZ OPH DX IMG PST SGM RTA: CPT | Performed by: OPHTHALMOLOGY

## 2024-12-24 ASSESSMENT — INTRAOCULAR PRESSURE
OS: 16
OD: 14

## 2025-01-02 ENCOUNTER — SURGERY (OUTPATIENT)
Dept: URBAN - METROPOLITAN AREA SURGERY 19 | Facility: SURGERY | Age: 75
End: 2025-01-02
Payer: COMMERCIAL

## 2025-01-02 PROCEDURE — 66982 XCAPSL CTRC RMVL CPLX WO ECP: CPT | Performed by: OPHTHALMOLOGY

## 2025-01-03 ENCOUNTER — POST-OPERATIVE VISIT (OUTPATIENT)
Dept: URBAN - METROPOLITAN AREA CLINIC 51 | Facility: CLINIC | Age: 75
End: 2025-01-03
Payer: COMMERCIAL

## 2025-01-03 DIAGNOSIS — Z48.810 ENCOUNTER FOR SURGICAL AFTERCARE FOLLOWING SURGERY ON A SENSE ORGAN: Primary | ICD-10-CM

## 2025-01-03 PROCEDURE — 99024 POSTOP FOLLOW-UP VISIT: CPT

## 2025-01-03 ASSESSMENT — INTRAOCULAR PRESSURE
OD: 12
OD: 18

## 2025-01-09 ENCOUNTER — POST-OPERATIVE VISIT (OUTPATIENT)
Dept: URBAN - METROPOLITAN AREA CLINIC 51 | Facility: CLINIC | Age: 75
End: 2025-01-09
Payer: COMMERCIAL

## 2025-01-09 DIAGNOSIS — Z48.810 ENCOUNTER FOR SURGICAL AFTERCARE FOLLOWING SURGERY ON A SENSE ORGAN: Primary | ICD-10-CM

## 2025-01-09 PROCEDURE — 99024 POSTOP FOLLOW-UP VISIT: CPT | Performed by: OPTOMETRIST

## 2025-01-09 ASSESSMENT — VISUAL ACUITY
OS: 20/50
OD: 20/40

## 2025-01-09 ASSESSMENT — KERATOMETRY
OS: 43.25
OD: 43.75

## 2025-01-09 ASSESSMENT — INTRAOCULAR PRESSURE
OS: 16
OD: 16

## 2025-02-10 ENCOUNTER — OFFICE VISIT (OUTPATIENT)
Dept: URBAN - METROPOLITAN AREA CLINIC 51 | Facility: CLINIC | Age: 75
End: 2025-02-10
Payer: COMMERCIAL

## 2025-02-10 DIAGNOSIS — H34.8320 TRIBUTARY (BRANCH) RETINAL VEIN OCCLUSION, LEFT EYE, WITH MACULAR EDEMA: Primary | ICD-10-CM

## 2025-02-10 DIAGNOSIS — H35.81 RETINAL EDEMA: ICD-10-CM

## 2025-02-10 PROCEDURE — 99214 OFFICE O/P EST MOD 30 MIN: CPT | Performed by: OPHTHALMOLOGY

## 2025-02-10 PROCEDURE — 92134 CPTRZ OPH DX IMG PST SGM RTA: CPT | Performed by: OPHTHALMOLOGY

## 2025-02-10 PROCEDURE — 67028 INJECTION EYE DRUG: CPT | Performed by: OPHTHALMOLOGY

## 2025-02-10 RX ORDER — PREDNISOLONE ACETATE 10 MG/ML
1 % SUSPENSION/ DROPS OPHTHALMIC
Qty: 10 | Refills: 3 | Status: INACTIVE
Start: 2025-02-10 | End: 2025-02-10

## 2025-02-10 RX ORDER — LATANOPROST 50 UG/ML
0.005 % SOLUTION OPHTHALMIC
Qty: 7.5 | Refills: 1 | Status: ACTIVE
Start: 2025-02-10

## 2025-02-10 RX ORDER — DICLOFENAC SODIUM 1 MG/ML
0.1 % SOLUTION/ DROPS OPHTHALMIC
Qty: 10 | Refills: 3 | Status: INACTIVE
Start: 2025-02-10 | End: 2025-02-10

## 2025-02-10 RX ORDER — PREDNISOLONE ACETATE 10 MG/ML
1 % SUSPENSION/ DROPS OPHTHALMIC
Qty: 10 | Refills: 3 | Status: ACTIVE
Start: 2025-02-10

## 2025-02-10 RX ORDER — DICLOFENAC SODIUM 1 MG/ML
0.1 % SOLUTION/ DROPS OPHTHALMIC
Qty: 10 | Refills: 3 | Status: ACTIVE
Start: 2025-02-10

## 2025-02-10 ASSESSMENT — INTRAOCULAR PRESSURE
OD: 12
OS: 12

## 2025-04-07 ENCOUNTER — OFFICE VISIT (OUTPATIENT)
Dept: URBAN - METROPOLITAN AREA CLINIC 51 | Facility: CLINIC | Age: 75
End: 2025-04-07
Payer: COMMERCIAL

## 2025-04-07 DIAGNOSIS — H34.8320 TRIBUTARY (BRANCH) RETINAL VEIN OCCLUSION, LEFT EYE, WITH MACULAR EDEMA: Primary | ICD-10-CM

## 2025-04-07 DIAGNOSIS — H35.81 RETINAL EDEMA: ICD-10-CM

## 2025-04-07 PROCEDURE — 67028 INJECTION EYE DRUG: CPT | Performed by: OPHTHALMOLOGY

## 2025-04-07 PROCEDURE — 92134 CPTRZ OPH DX IMG PST SGM RTA: CPT | Performed by: OPHTHALMOLOGY

## 2025-04-07 RX ORDER — PREDNISOLONE ACETATE 10 MG/ML
1 % SUSPENSION/ DROPS OPHTHALMIC
Qty: 10 | Refills: 3 | Status: ACTIVE
Start: 2025-04-07

## 2025-04-07 RX ORDER — DICLOFENAC SODIUM 1 MG/ML
0.1 % SOLUTION/ DROPS OPHTHALMIC
Qty: 10 | Refills: 3 | Status: ACTIVE
Start: 2025-04-07

## 2025-04-07 ASSESSMENT — INTRAOCULAR PRESSURE
OD: 12
OS: 11

## 2025-05-05 ENCOUNTER — OFFICE VISIT (OUTPATIENT)
Dept: URBAN - METROPOLITAN AREA CLINIC 44 | Facility: CLINIC | Age: 75
End: 2025-05-05
Payer: COMMERCIAL

## 2025-05-05 DIAGNOSIS — H34.8320 TRIBUTARY (BRANCH) RETINAL VEIN OCCLUSION, LEFT EYE, WITH MACULAR EDEMA: Primary | ICD-10-CM

## 2025-05-05 DIAGNOSIS — H25.812 COMBINED FORMS OF AGE-RELATED CATARACT, LEFT EYE: ICD-10-CM

## 2025-05-05 PROCEDURE — 67028 INJECTION EYE DRUG: CPT | Performed by: OPHTHALMOLOGY

## 2025-05-05 PROCEDURE — 92235 FLUORESCEIN ANGRPH MLTIFRAME: CPT | Performed by: OPHTHALMOLOGY

## 2025-05-05 PROCEDURE — 99214 OFFICE O/P EST MOD 30 MIN: CPT | Performed by: OPHTHALMOLOGY

## 2025-05-05 PROCEDURE — 92134 CPTRZ OPH DX IMG PST SGM RTA: CPT | Performed by: OPHTHALMOLOGY

## 2025-05-05 ASSESSMENT — INTRAOCULAR PRESSURE
OD: 9
OS: 6

## 2025-06-16 ENCOUNTER — OFFICE VISIT (OUTPATIENT)
Dept: URBAN - METROPOLITAN AREA CLINIC 51 | Facility: CLINIC | Age: 75
End: 2025-06-16
Payer: COMMERCIAL

## 2025-06-16 DIAGNOSIS — H34.8320 TRIBUTARY (BRANCH) RETINAL VEIN OCCLUSION, LEFT EYE, WITH MACULAR EDEMA: Primary | ICD-10-CM

## 2025-06-16 DIAGNOSIS — H25.812 COMBINED FORMS OF AGE-RELATED CATARACT, LEFT EYE: ICD-10-CM

## 2025-06-16 PROCEDURE — 92134 CPTRZ OPH DX IMG PST SGM RTA: CPT | Performed by: OPHTHALMOLOGY

## 2025-06-16 PROCEDURE — 67028 INJECTION EYE DRUG: CPT | Performed by: OPHTHALMOLOGY

## 2025-06-16 ASSESSMENT — INTRAOCULAR PRESSURE
OD: 12
OS: 13

## 2025-06-23 ENCOUNTER — OFFICE VISIT (OUTPATIENT)
Dept: URBAN - METROPOLITAN AREA CLINIC 51 | Facility: CLINIC | Age: 75
End: 2025-06-23
Payer: COMMERCIAL

## 2025-06-23 DIAGNOSIS — H25.812 COMBINED FORMS OF AGE-RELATED CATARACT, LEFT EYE: Primary | ICD-10-CM

## 2025-06-23 DIAGNOSIS — H26.491 OTHER SECONDARY CATARACT, RIGHT EYE: ICD-10-CM

## 2025-06-23 DIAGNOSIS — H40.1122 PRIMARY OPEN-ANGLE GLAUCOMA, MODERATE STAGE, LEFT EYE: ICD-10-CM

## 2025-06-23 DIAGNOSIS — H34.8320 BRANCH RETINAL VEIN OCCLUSION W/ MACULAR EDEMA, LEFT EYE: ICD-10-CM

## 2025-06-23 PROCEDURE — 92134 CPTRZ OPH DX IMG PST SGM RTA: CPT | Performed by: OPTOMETRIST

## 2025-06-23 PROCEDURE — 99214 OFFICE O/P EST MOD 30 MIN: CPT | Performed by: OPTOMETRIST

## 2025-06-23 RX ORDER — LATANOPROST 50 UG/ML
0.005 % SOLUTION OPHTHALMIC
Qty: 7.5 | Refills: 3 | Status: ACTIVE
Start: 2025-06-23

## 2025-06-23 RX ORDER — BRINZOLAMIDE/BRIMONIDINE TARTRATE 10; 2 MG/ML; MG/ML
SUSPENSION/ DROPS OPHTHALMIC
Qty: 10 | Refills: 3 | Status: ACTIVE
Start: 2025-06-23

## 2025-06-23 RX ORDER — TIMOLOL MALEATE 5 MG/ML
0.5 % SOLUTION/ DROPS OPHTHALMIC
Qty: 10 | Refills: 3 | Status: ACTIVE
Start: 2025-06-23

## 2025-06-23 ASSESSMENT — VISUAL ACUITY
OD: 20/25
OS: 20/25

## 2025-06-23 ASSESSMENT — KERATOMETRY
OS: 42.75
OD: 43.63

## 2025-06-23 ASSESSMENT — INTRAOCULAR PRESSURE
OS: 14
OD: 14

## 2025-07-28 ENCOUNTER — OFFICE VISIT (OUTPATIENT)
Dept: URBAN - METROPOLITAN AREA CLINIC 51 | Facility: CLINIC | Age: 75
End: 2025-07-28
Payer: COMMERCIAL

## 2025-07-28 DIAGNOSIS — H34.8320 TRIBUTARY (BRANCH) RETINAL VEIN OCCLUSION, LEFT EYE, WITH MACULAR EDEMA: Primary | ICD-10-CM

## 2025-07-28 DIAGNOSIS — H25.812 COMBINED FORMS OF AGE-RELATED CATARACT, LEFT EYE: ICD-10-CM

## 2025-07-28 PROCEDURE — 67028 INJECTION EYE DRUG: CPT | Performed by: OPHTHALMOLOGY

## 2025-07-28 PROCEDURE — 92134 CPTRZ OPH DX IMG PST SGM RTA: CPT | Performed by: OPHTHALMOLOGY

## 2025-07-28 ASSESSMENT — INTRAOCULAR PRESSURE
OD: 16
OS: 14

## 2025-08-07 ENCOUNTER — SURGERY (OUTPATIENT)
Dept: URBAN - METROPOLITAN AREA SURGERY 19 | Facility: SURGERY | Age: 75
End: 2025-08-07
Payer: COMMERCIAL

## 2025-08-07 PROCEDURE — 66821 AFTER CATARACT LASER SURGERY: CPT | Performed by: OPHTHALMOLOGY

## 2025-08-21 ENCOUNTER — ADULT PHYSICAL (OUTPATIENT)
Dept: URBAN - METROPOLITAN AREA CLINIC 51 | Facility: CLINIC | Age: 75
End: 2025-08-21
Payer: COMMERCIAL

## 2025-08-21 DIAGNOSIS — H25.812 COMBINED FORMS OF AGE-RELATED CATARACT, LEFT EYE: ICD-10-CM

## 2025-08-21 DIAGNOSIS — Z01.818 ENCOUNTER FOR OTHER PREPROCEDURAL EXAMINATION: Primary | ICD-10-CM

## 2025-08-21 PROCEDURE — 99213 OFFICE O/P EST LOW 20 MIN: CPT | Performed by: PHYSICIAN ASSISTANT

## 2025-08-25 ENCOUNTER — OFFICE VISIT (OUTPATIENT)
Dept: URBAN - METROPOLITAN AREA CLINIC 51 | Facility: CLINIC | Age: 75
End: 2025-08-25
Payer: COMMERCIAL

## 2025-08-25 DIAGNOSIS — H43.393 OTHER VITREOUS OPACITIES, BILATERAL: ICD-10-CM

## 2025-08-25 DIAGNOSIS — H25.812 COMBINED FORMS OF AGE-RELATED CATARACT, LEFT EYE: Primary | ICD-10-CM

## 2025-08-25 DIAGNOSIS — H02.834 DERMATOCHALASIS OF LEFT UPPER EYELID: ICD-10-CM

## 2025-08-25 DIAGNOSIS — H04.123 TEAR FILM INSUFFICIENCY OF BILATERAL LACRIMAL GLANDS: ICD-10-CM

## 2025-08-25 DIAGNOSIS — H52.203 BILATERAL ASTIGMATISM: ICD-10-CM

## 2025-08-25 DIAGNOSIS — H47.212 PRIMARY OPTIC ATROPHY, LEFT EYE: ICD-10-CM

## 2025-08-25 DIAGNOSIS — H40.1122 PRIMARY OPEN-ANGLE GLAUCOMA, MODERATE STAGE, LEFT EYE: ICD-10-CM

## 2025-08-25 DIAGNOSIS — H34.8322 BRANCH RETINAL VEIN OCCLUSION, STABLE, LEFT EYE: ICD-10-CM

## 2025-08-25 DIAGNOSIS — H02.831 DERMATOCHALASIS OF RIGHT UPPER EYELID: ICD-10-CM

## 2025-08-25 PROCEDURE — 92083 EXTENDED VISUAL FIELD XM: CPT | Performed by: OPHTHALMOLOGY

## 2025-08-25 PROCEDURE — 92020 GONIOSCOPY: CPT | Performed by: OPHTHALMOLOGY

## 2025-08-25 PROCEDURE — 99214 OFFICE O/P EST MOD 30 MIN: CPT | Performed by: OPHTHALMOLOGY

## 2025-08-25 RX ORDER — BRINZOLAMIDE/BRIMONIDINE TARTRATE 10; 2 MG/ML; MG/ML
SUSPENSION/ DROPS OPHTHALMIC
Qty: 10 | Refills: 3 | Status: ACTIVE
Start: 2025-08-25

## 2025-08-25 RX ORDER — LATANOPROST 50 UG/ML
0.005 % SOLUTION/ DROPS OPHTHALMIC
Qty: 7.5 | Refills: 3 | Status: ACTIVE
Start: 2025-08-25

## 2025-08-25 ASSESSMENT — INTRAOCULAR PRESSURE
OS: 25
OD: 18